# Patient Record
Sex: FEMALE | Race: WHITE | NOT HISPANIC OR LATINO | URBAN - METROPOLITAN AREA
[De-identification: names, ages, dates, MRNs, and addresses within clinical notes are randomized per-mention and may not be internally consistent; named-entity substitution may affect disease eponyms.]

---

## 2017-02-03 ENCOUNTER — OUTPATIENT (OUTPATIENT)
Dept: OUTPATIENT SERVICES | Facility: HOSPITAL | Age: 62
LOS: 1 days | Discharge: HOME | End: 2017-02-03

## 2017-06-27 DIAGNOSIS — Z00.00 ENCOUNTER FOR GENERAL ADULT MEDICAL EXAMINATION WITHOUT ABNORMAL FINDINGS: ICD-10-CM

## 2018-02-02 ENCOUNTER — OUTPATIENT (OUTPATIENT)
Dept: OUTPATIENT SERVICES | Facility: HOSPITAL | Age: 63
LOS: 1 days | Discharge: HOME | End: 2018-02-02

## 2018-02-02 DIAGNOSIS — Z00.00 ENCOUNTER FOR GENERAL ADULT MEDICAL EXAMINATION WITHOUT ABNORMAL FINDINGS: ICD-10-CM

## 2019-02-01 ENCOUNTER — OUTPATIENT (OUTPATIENT)
Dept: OUTPATIENT SERVICES | Facility: HOSPITAL | Age: 64
LOS: 1 days | Discharge: HOME | End: 2019-02-01

## 2019-02-01 DIAGNOSIS — Z00.00 ENCOUNTER FOR GENERAL ADULT MEDICAL EXAMINATION WITHOUT ABNORMAL FINDINGS: ICD-10-CM

## 2022-04-06 ENCOUNTER — OUTPATIENT (OUTPATIENT)
Dept: OUTPATIENT SERVICES | Facility: HOSPITAL | Age: 67
LOS: 1 days | Discharge: HOME | End: 2022-04-06
Payer: MEDICARE

## 2022-04-06 DIAGNOSIS — R13.10 DYSPHAGIA, UNSPECIFIED: ICD-10-CM

## 2022-04-06 PROCEDURE — 74220 X-RAY XM ESOPHAGUS 1CNTRST: CPT | Mod: 26

## 2024-07-29 ENCOUNTER — APPOINTMENT (OUTPATIENT)
Dept: ORTHOPEDIC SURGERY | Facility: CLINIC | Age: 69
End: 2024-07-29
Payer: MEDICARE

## 2024-07-29 DIAGNOSIS — M17.12 UNILATERAL PRIMARY OSTEOARTHRITIS, LEFT KNEE: ICD-10-CM

## 2024-07-29 PROBLEM — Z00.00 ENCOUNTER FOR PREVENTIVE HEALTH EXAMINATION: Status: ACTIVE | Noted: 2024-07-29

## 2024-07-29 PROCEDURE — 99203 OFFICE O/P NEW LOW 30 MIN: CPT

## 2024-07-29 RX ORDER — MELOXICAM 15 MG/1
15 TABLET ORAL DAILY
Qty: 30 | Refills: 1 | Status: ACTIVE | COMMUNITY
Start: 2024-07-29 | End: 1900-01-01

## 2024-07-29 NOTE — ASSESSMENT
[FreeTextEntry1] : Assessment: Left knee arthritis  Plan: 1.  Clinical and x-ray report findings were discussed with the patient 2.  I discussed the natural history of arthritis and treatment options with her including initial conservative management with NSAIDs and physical therapy.  She would like to avoid surgery if possible.  I advised her to take meloxicam daily during this flareup.  I sent a prescription for meloxicam to her pharmacy.  NSAID precautions were given.  I discussed the option of physical therapy with her she is interested in trying this.  I provided her with a referral for physical therapy. 3.  I discussed with her that should her symptoms not improve with a course of NSAIDs and physical therapy, we can discuss the option of a knee injection at her next visit.  She is amenable to this. 4.  I would like to see her back for follow-up in about 6 weeks.  Call discussed with the patient and she is in agreement.  All questions were answered.  She will also try to obtain a copy of her left knee x-rays from this over the allergy and bring them to her next visit for review.

## 2024-07-29 NOTE — HISTORY OF PRESENT ILLNESS
[de-identified] : The patient is a 69-year-old female who is here today for evaluation of chronic left knee pain.  She has a long history of arthritis in the left knee.  She recently had increased pain in her left knee starting around June of this year.  She also had associated swelling.  She was evaluated at [facility] and was started on meloxicam and also given a 5-day Medrol Dosepak.  She does not feel like she did not improvement with the Medrol Dosepak.  She has only been taking meloxicam as needed but feels like this does not help with her pain.  She has not done any physical therapy in the past.  No prior injections.  Past medical/surgical history: Arthritis, connective tissue disease, bunion surgery, squamous cell skin cancer lesion removal  Allergies: None  Current medications: Hydroxychloroquine, cetirizine, latanoprost  Family history: Noncontributory  Social history:  Daily alcohol use Denies recreational drug use Retired  Review of systems: A 10-point review of systems was positive for joint pain, joint swelling, menopause, hearing change, otherwise unremarkable as noted on the new patient questionnaire  The patient is well-appearing.  She ambulates with a normal gait with no assistive devices.  Examination of the left knee demonstrates intact skin.  There is mild swelling/effusion.  No tenderness to palpation along the medial or lateral joint lines.  Mild crepitus.  Knee range of motion of 0 to 130 degrees.  Negative Lachman's and negative posterior drawer.  Stable to varus and valgus stress.  Neurovascularly intact distally.  The patient deferred getting x-rays of her left knee in the office today.  She did bring with her a report of an x-ray of her left knee performed at [facility] radiology on 7/3/2024.  The report notes severe patellofemoral and moderate severe lateral and medial compartment osteoarthritis with chondrocalcinosis of the lateral compartment.  Moderate to large suprapatellar effusion.

## 2024-08-29 ENCOUNTER — APPOINTMENT (OUTPATIENT)
Dept: ORTHOPEDIC SURGERY | Facility: CLINIC | Age: 69
End: 2024-08-29
Payer: MEDICARE

## 2024-08-29 DIAGNOSIS — M17.12 UNILATERAL PRIMARY OSTEOARTHRITIS, LEFT KNEE: ICD-10-CM

## 2024-08-29 PROCEDURE — 20610 DRAIN/INJ JOINT/BURSA W/O US: CPT | Mod: LT

## 2024-08-29 PROCEDURE — 99213 OFFICE O/P EST LOW 20 MIN: CPT | Mod: 25

## 2024-08-29 NOTE — HISTORY OF PRESENT ILLNESS
[de-identified] : The patient is a 69-year-old female who is here today for follow-up of left knee arthritis.  I last saw her on 7/29/2024.  She has tried to take meloxicam and also ibuprofen 800.  She has tried physical therapy but continues to have pain in her left knee.  She feels like the medications and physical therapy are not helping much.  If anything, she will start buspirone.  The patient is well-appearing.  She ambulates with normal gait.  Examination of the left knee demonstrates intact skin.  Mild swelling/joint.  No tenderness over the medial and lateral joint lines.  Mild crepitus.  Range of motion is 0 to 130 degrees.  Unable  Left knee x-rays taken at [facility] radiology on 7/3/2021 were personally reviewed demonstrating tricompartmental degenerative changes with joint space narrowing

## 2024-08-29 NOTE — ASSESSMENT
[FreeTextEntry1] : Assessment: Left knee arthritis  Plan: 1.  Clinical and radiographic findings were discussed with the patient 2.  I again discussed the natural history of arthritis and treatment options with her.  At this time, she has tried NSAIDs and physical therapy without any improvement in her symptoms.  I discussed the option of a cortisone injection.  I discussed the risk and benefits of this.  The risks included but were not limited to bleeding, infection, injury to nearby nerves/vessels/structures, skin irritation, discoloration of skin around the injection site, and no guarantee of pain relief.  She understood and elected to proceed.  Verbal consent was obtained.  A left knee cortisone injection was performed under sterile conditions with 3 cc of 2% lidocaine, 3 cc of quarter percent bupivacaine, and 8 mg of dexamethasone.  She tolerated the procedure well.  I discussed with her that should she have good pain relief from the injection, we can repeat the injection every 6 months as needed. 3.  I recommended that she continue with physical therapy as scheduled and NSAIDs as needed.  NSAID precautions were again given. 4.  I like to see her back for follow-up in about 6 months.  Plan of care discussed with the patient and she is in agreement.  All questions were answered.  X-rays needed at next visit: None

## 2025-02-17 ENCOUNTER — APPOINTMENT (OUTPATIENT)
Dept: ORTHOPEDIC SURGERY | Facility: CLINIC | Age: 70
End: 2025-02-17

## 2025-07-05 ENCOUNTER — NON-APPOINTMENT (OUTPATIENT)
Age: 70
End: 2025-07-05

## 2025-07-07 ENCOUNTER — APPOINTMENT (OUTPATIENT)
Dept: CARDIOLOGY | Facility: CLINIC | Age: 70
End: 2025-07-07
Payer: MEDICARE

## 2025-07-07 VITALS
DIASTOLIC BLOOD PRESSURE: 80 MMHG | WEIGHT: 135 LBS | HEIGHT: 61 IN | BODY MASS INDEX: 25.49 KG/M2 | SYSTOLIC BLOOD PRESSURE: 130 MMHG | HEART RATE: 70 BPM

## 2025-07-07 VITALS — SYSTOLIC BLOOD PRESSURE: 126 MMHG | DIASTOLIC BLOOD PRESSURE: 80 MMHG

## 2025-07-07 PROBLEM — Z78.9 SOCIAL ALCOHOL USE: Status: ACTIVE | Noted: 2025-07-07

## 2025-07-07 PROBLEM — I10 BENIGN ESSENTIAL HYPERTENSION: Status: ACTIVE | Noted: 2025-07-07

## 2025-07-07 PROBLEM — R42 DIZZINESS: Status: ACTIVE | Noted: 2025-07-07

## 2025-07-07 PROBLEM — M35.1 MCTD (MIXED CONNECTIVE TISSUE DISEASE): Status: ACTIVE | Noted: 2025-07-07

## 2025-07-07 PROBLEM — Z78.9 NON-SMOKER: Status: ACTIVE | Noted: 2025-07-07

## 2025-07-07 PROBLEM — I73.00 RAYNAUD'S DISEASE WITHOUT GANGRENE: Status: ACTIVE | Noted: 2025-07-07

## 2025-07-07 PROCEDURE — 99204 OFFICE O/P NEW MOD 45 MIN: CPT

## 2025-07-07 PROCEDURE — 93000 ELECTROCARDIOGRAM COMPLETE: CPT

## 2025-07-07 PROCEDURE — G2211 COMPLEX E/M VISIT ADD ON: CPT

## 2025-07-07 RX ORDER — AMLODIPINE BESYLATE 5 MG/1
5 TABLET ORAL
Refills: 0 | Status: ACTIVE | COMMUNITY

## 2025-07-07 RX ORDER — HYDROXYCHLOROQUINE SULFATE 200 MG/1
200 TABLET, FILM COATED ORAL
Refills: 0 | Status: ACTIVE | COMMUNITY

## 2025-07-07 RX ORDER — LATANOPROST/PF 0.005 %
0.01 DROPS OPHTHALMIC (EYE)
Refills: 0 | Status: ACTIVE | COMMUNITY

## 2025-07-07 RX ORDER — CETIRIZINE HYDROCHLORIDE 10 MG/1
10 TABLET, COATED ORAL
Refills: 0 | Status: ACTIVE | COMMUNITY

## 2025-07-23 ENCOUNTER — APPOINTMENT (OUTPATIENT)
Dept: CARDIOLOGY | Facility: CLINIC | Age: 70
End: 2025-07-23
Payer: MEDICARE

## 2025-07-23 PROCEDURE — 93880 EXTRACRANIAL BILAT STUDY: CPT

## 2025-07-23 PROCEDURE — 93306 TTE W/DOPPLER COMPLETE: CPT
